# Patient Record
Sex: MALE | Race: BLACK OR AFRICAN AMERICAN | Employment: OTHER | URBAN - METROPOLITAN AREA
[De-identification: names, ages, dates, MRNs, and addresses within clinical notes are randomized per-mention and may not be internally consistent; named-entity substitution may affect disease eponyms.]

---

## 2021-08-09 ENCOUNTER — TRANSCRIBE ORDER (OUTPATIENT)
Dept: SCHEDULING | Age: 56
End: 2021-08-09

## 2021-08-09 DIAGNOSIS — Z01.89 RADIOLOGICAL EXAMINATION, NOT ELSEWHERE CLASSIFIED: Primary | ICD-10-CM

## 2021-08-17 ENCOUNTER — HOSPITAL ENCOUNTER (OUTPATIENT)
Dept: MRI IMAGING | Age: 56
Discharge: HOME | End: 2021-08-17
Payer: OTHER GOVERNMENT

## 2021-08-17 DIAGNOSIS — Z01.89 RADIOLOGICAL EXAMINATION, NOT ELSEWHERE CLASSIFIED: ICD-10-CM

## 2021-08-17 PROCEDURE — 73221 MRI JOINT UPR EXTREM W/O DYE: CPT

## 2021-08-17 PROCEDURE — 73721 MRI JNT OF LWR EXTRE W/O DYE: CPT

## 2024-04-09 ENCOUNTER — LAB REQUISITION (OUTPATIENT)
Dept: LAB | Age: 59
End: 2024-04-09

## 2024-04-09 DIAGNOSIS — Z13.9 ENCOUNTER FOR SCREENING, UNSPECIFIED: ICD-10-CM

## 2024-04-09 PROCEDURE — 86592 SYPHILIS TEST NON-TREP QUAL: CPT | Performed by: CLINICAL MEDICAL LABORATORY

## 2024-04-09 PROCEDURE — PSEU8570 RPR (RAPID PLASMA REAGIN) TRADITIONAL SYPHILIS SCREEN ALGORITHM: Performed by: CLINICAL MEDICAL LABORATORY

## 2024-04-09 PROCEDURE — PSEU8563 TREPONEMA PALLIDUM ANTIBODY IGG AND IGM: Performed by: CLINICAL MEDICAL LABORATORY

## 2024-04-09 PROCEDURE — 86780 TREPONEMA PALLIDUM: CPT | Performed by: CLINICAL MEDICAL LABORATORY

## 2024-04-10 LAB
RPR SER QL: NONREACTIVE
T PALLIDUM IGG SER QL IA: REACTIVE

## 2024-11-13 ENCOUNTER — APPOINTMENT (OUTPATIENT)
Facility: HOSPITAL | Age: 59
End: 2024-11-13
Attending: SURGERY
Payer: OTHER GOVERNMENT

## 2024-11-13 ENCOUNTER — APPOINTMENT (OUTPATIENT)
Facility: HOSPITAL | Age: 59
End: 2024-11-13
Payer: OTHER GOVERNMENT

## 2024-11-13 ENCOUNTER — HOSPITAL ENCOUNTER (EMERGENCY)
Facility: HOSPITAL | Age: 59
Discharge: ANOTHER HOSPITAL | End: 2024-11-13
Attending: EMERGENCY MEDICINE
Payer: OTHER GOVERNMENT

## 2024-11-13 ENCOUNTER — HOSPITAL ENCOUNTER (INPATIENT)
Facility: HOSPITAL | Age: 59
LOS: 3 days | Discharge: HOME | End: 2024-11-16
Attending: SURGERY | Admitting: SURGERY
Payer: OTHER GOVERNMENT

## 2024-11-13 VITALS
DIASTOLIC BLOOD PRESSURE: 88 MMHG | HEART RATE: 82 BPM | OXYGEN SATURATION: 100 % | WEIGHT: 227.5 LBS | SYSTOLIC BLOOD PRESSURE: 192 MMHG | TEMPERATURE: 98.8 F | RESPIRATION RATE: 18 BRPM

## 2024-11-13 DIAGNOSIS — K81.0 ACUTE CHOLECYSTITIS: Primary | ICD-10-CM

## 2024-11-13 DIAGNOSIS — R06.02 SHORTNESS OF BREATH: Primary | ICD-10-CM

## 2024-11-13 PROBLEM — R10.9 ABDOMINAL PAIN: Status: ACTIVE | Noted: 2024-11-13

## 2024-11-13 LAB
ALBUMIN SERPL-MCNC: 3.5 G/DL (ref 3.5–5)
ALBUMIN/GLOB SERPL: 0.8 (ref 1.1–2.2)
ALP SERPL-CCNC: 64 U/L (ref 45–117)
ALT SERPL-CCNC: 43 U/L (ref 12–78)
ANION GAP SERPL CALC-SCNC: 12 MMOL/L (ref 2–12)
AST SERPL W P-5'-P-CCNC: 31 U/L (ref 15–37)
BASOPHILS # BLD: 0 K/UL (ref 0–0.1)
BASOPHILS NFR BLD: 1 % (ref 0–1)
BILIRUB SERPL-MCNC: 1 MG/DL (ref 0.2–1)
BUN SERPL-MCNC: 17 MG/DL (ref 6–20)
BUN/CREAT SERPL: 13 (ref 12–20)
CA-I BLD-MCNC: 8.9 MG/DL (ref 8.5–10.1)
CHLORIDE SERPL-SCNC: 99 MMOL/L (ref 97–108)
CO2 SERPL-SCNC: 27 MMOL/L (ref 21–32)
CREAT SERPL-MCNC: 1.28 MG/DL (ref 0.7–1.3)
DIFFERENTIAL METHOD BLD: ABNORMAL
EOSINOPHIL # BLD: 0.1 K/UL (ref 0–0.4)
EOSINOPHIL NFR BLD: 1 % (ref 0–7)
ERYTHROCYTE [DISTWIDTH] IN BLOOD BY AUTOMATED COUNT: 14.6 % (ref 11.5–14.5)
ETHANOL SERPL-MCNC: <10 MG/DL (ref 0–0.08)
GLOBULIN SER CALC-MCNC: 4.5 G/DL (ref 2–4)
GLUCOSE SERPL-MCNC: 121 MG/DL (ref 65–100)
HCT VFR BLD AUTO: 42.5 % (ref 36.6–50.3)
HGB BLD-MCNC: 14.6 G/DL (ref 12.1–17)
IMM GRANULOCYTES # BLD AUTO: 0 K/UL (ref 0–0.04)
IMM GRANULOCYTES NFR BLD AUTO: 0 % (ref 0–0.5)
LACTATE SERPL-SCNC: 1 MMOL/L (ref 0.4–2)
LACTATE SERPL-SCNC: 2.1 MMOL/L (ref 0.4–2)
LIPASE SERPL-CCNC: 46 U/L (ref 13–75)
LYMPHOCYTES # BLD: 0.8 K/UL (ref 0.8–3.5)
LYMPHOCYTES NFR BLD: 10 % (ref 12–49)
MAGNESIUM SERPL-MCNC: 1.7 MG/DL (ref 1.6–2.4)
MCH RBC QN AUTO: 26 PG (ref 26–34)
MCHC RBC AUTO-ENTMCNC: 34.4 G/DL (ref 30–36.5)
MCV RBC AUTO: 75.6 FL (ref 80–99)
MONOCYTES # BLD: 0.7 K/UL (ref 0–1)
MONOCYTES NFR BLD: 9 % (ref 5–13)
NEUTS SEG # BLD: 6.1 K/UL (ref 1.8–8)
NEUTS SEG NFR BLD: 79 % (ref 32–75)
NRBC # BLD: 0 K/UL (ref 0–0.01)
NRBC BLD-RTO: 0 PER 100 WBC
PLATELET # BLD AUTO: 221 K/UL (ref 150–400)
PMV BLD AUTO: 9 FL (ref 8.9–12.9)
POTASSIUM SERPL-SCNC: 3.5 MMOL/L (ref 3.5–5.1)
PROT SERPL-MCNC: 8 G/DL (ref 6.4–8.2)
RBC # BLD AUTO: 5.62 M/UL (ref 4.1–5.7)
SODIUM SERPL-SCNC: 138 MMOL/L (ref 136–145)
WBC # BLD AUTO: 7.7 K/UL (ref 4.1–11.1)

## 2024-11-13 PROCEDURE — 6370000000 HC RX 637 (ALT 250 FOR IP): Performed by: SURGERY

## 2024-11-13 PROCEDURE — 36415 COLL VENOUS BLD VENIPUNCTURE: CPT

## 2024-11-13 PROCEDURE — 1100000000 HC RM PRIVATE

## 2024-11-13 PROCEDURE — 83605 ASSAY OF LACTIC ACID: CPT

## 2024-11-13 PROCEDURE — 2580000003 HC RX 258: Performed by: SURGERY

## 2024-11-13 PROCEDURE — 6360000002 HC RX W HCPCS: Performed by: EMERGENCY MEDICINE

## 2024-11-13 PROCEDURE — 82077 ASSAY SPEC XCP UR&BREATH IA: CPT

## 2024-11-13 PROCEDURE — 83690 ASSAY OF LIPASE: CPT

## 2024-11-13 PROCEDURE — 85025 COMPLETE CBC W/AUTO DIFF WBC: CPT

## 2024-11-13 PROCEDURE — 80053 COMPREHEN METABOLIC PANEL: CPT

## 2024-11-13 PROCEDURE — 2580000003 HC RX 258: Performed by: EMERGENCY MEDICINE

## 2024-11-13 PROCEDURE — 83735 ASSAY OF MAGNESIUM: CPT

## 2024-11-13 PROCEDURE — 96375 TX/PRO/DX INJ NEW DRUG ADDON: CPT

## 2024-11-13 PROCEDURE — 6370000000 HC RX 637 (ALT 250 FOR IP): Performed by: EMERGENCY MEDICINE

## 2024-11-13 PROCEDURE — 76705 ECHO EXAM OF ABDOMEN: CPT

## 2024-11-13 PROCEDURE — 99285 EMERGENCY DEPT VISIT HI MDM: CPT

## 2024-11-13 PROCEDURE — 6360000004 HC RX CONTRAST MEDICATION: Performed by: SURGERY

## 2024-11-13 PROCEDURE — 6360000002 HC RX W HCPCS: Performed by: SURGERY

## 2024-11-13 PROCEDURE — 96365 THER/PROPH/DIAG IV INF INIT: CPT

## 2024-11-13 RX ORDER — SODIUM CHLORIDE, SODIUM LACTATE, POTASSIUM CHLORIDE, CALCIUM CHLORIDE 600; 310; 30; 20 MG/100ML; MG/100ML; MG/100ML; MG/100ML
INJECTION, SOLUTION INTRAVENOUS CONTINUOUS
Status: DISCONTINUED | OUTPATIENT
Start: 2024-11-13 | End: 2024-11-14

## 2024-11-13 RX ORDER — 0.9 % SODIUM CHLORIDE 0.9 %
1000 INTRAVENOUS SOLUTION INTRAVENOUS ONCE
Status: COMPLETED | OUTPATIENT
Start: 2024-11-13 | End: 2024-11-13

## 2024-11-13 RX ORDER — KETOROLAC TROMETHAMINE 30 MG/ML
30 INJECTION, SOLUTION INTRAMUSCULAR; INTRAVENOUS
Status: COMPLETED | OUTPATIENT
Start: 2024-11-13 | End: 2024-11-13

## 2024-11-13 RX ORDER — ONDANSETRON 2 MG/ML
4 INJECTION INTRAMUSCULAR; INTRAVENOUS EVERY 6 HOURS PRN
Status: DISCONTINUED | OUTPATIENT
Start: 2024-11-13 | End: 2024-11-16 | Stop reason: HOSPADM

## 2024-11-13 RX ORDER — ACETAMINOPHEN 500 MG
1000 TABLET ORAL
Status: DISCONTINUED | OUTPATIENT
Start: 2024-11-13 | End: 2024-11-13 | Stop reason: HOSPADM

## 2024-11-13 RX ORDER — DIATRIZOATE MEGLUMINE AND DIATRIZOATE SODIUM 660; 100 MG/ML; MG/ML
30 SOLUTION ORAL; RECTAL
Status: DISCONTINUED | OUTPATIENT
Start: 2024-11-13 | End: 2024-11-15

## 2024-11-13 RX ORDER — MORPHINE SULFATE 2 MG/ML
2 INJECTION, SOLUTION INTRAMUSCULAR; INTRAVENOUS EVERY 4 HOURS PRN
Status: DISCONTINUED | OUTPATIENT
Start: 2024-11-13 | End: 2024-11-14

## 2024-11-13 RX ORDER — ONDANSETRON 2 MG/ML
4 INJECTION INTRAMUSCULAR; INTRAVENOUS ONCE
Status: COMPLETED | OUTPATIENT
Start: 2024-11-13 | End: 2024-11-13

## 2024-11-13 RX ORDER — ACETAMINOPHEN 325 MG/1
650 TABLET ORAL EVERY 6 HOURS PRN
Status: DISCONTINUED | OUTPATIENT
Start: 2024-11-13 | End: 2024-11-16 | Stop reason: HOSPADM

## 2024-11-13 RX ORDER — NIFEDIPINE 30 MG/1
30 TABLET, EXTENDED RELEASE ORAL
Status: COMPLETED | OUTPATIENT
Start: 2024-11-13 | End: 2024-11-13

## 2024-11-13 RX ADMIN — SODIUM CHLORIDE, POTASSIUM CHLORIDE, SODIUM LACTATE AND CALCIUM CHLORIDE: 600; 310; 30; 20 INJECTION, SOLUTION INTRAVENOUS at 23:13

## 2024-11-13 RX ADMIN — PIPERACILLIN AND TAZOBACTAM 4500 MG: 4; .5 INJECTION, POWDER, FOR SOLUTION INTRAVENOUS; PARENTERAL at 11:13

## 2024-11-13 RX ADMIN — KETOROLAC TROMETHAMINE 30 MG: 30 INJECTION, SOLUTION INTRAMUSCULAR at 09:01

## 2024-11-13 RX ADMIN — SODIUM CHLORIDE 1000 ML: 9 INJECTION, SOLUTION INTRAVENOUS at 09:02

## 2024-11-13 RX ADMIN — ONDANSETRON 4 MG: 2 INJECTION, SOLUTION INTRAMUSCULAR; INTRAVENOUS at 15:52

## 2024-11-13 RX ADMIN — MORPHINE SULFATE 2 MG: 2 INJECTION, SOLUTION INTRAMUSCULAR; INTRAVENOUS at 23:06

## 2024-11-13 RX ADMIN — HYDROMORPHONE HYDROCHLORIDE 1 MG: 1 INJECTION, SOLUTION INTRAMUSCULAR; INTRAVENOUS; SUBCUTANEOUS at 15:52

## 2024-11-13 RX ADMIN — NIFEDIPINE 30 MG: 30 TABLET, EXTENDED RELEASE ORAL at 14:39

## 2024-11-13 RX ADMIN — DIATRIZOATE MEGLUMINE AND DIATRIZOATE SODIUM 30 ML: 660; 100 LIQUID ORAL; RECTAL at 21:52

## 2024-11-13 RX ADMIN — ACETAMINOPHEN 650 MG: 325 TABLET ORAL at 23:08

## 2024-11-13 ASSESSMENT — PAIN DESCRIPTION - LOCATION
LOCATION: ABDOMEN

## 2024-11-13 ASSESSMENT — PAIN DESCRIPTION - DESCRIPTORS
DESCRIPTORS: SHOOTING
DESCRIPTORS: PRESSURE
DESCRIPTORS: SHARP

## 2024-11-13 ASSESSMENT — PAIN DESCRIPTION - ORIENTATION
ORIENTATION: MID
ORIENTATION: ANTERIOR
ORIENTATION: ANTERIOR

## 2024-11-13 ASSESSMENT — PAIN SCALES - GENERAL
PAINLEVEL_OUTOF10: 8
PAINLEVEL_OUTOF10: 7
PAINLEVEL_OUTOF10: 7
PAINLEVEL_OUTOF10: 10

## 2024-11-13 ASSESSMENT — PAIN - FUNCTIONAL ASSESSMENT: PAIN_FUNCTIONAL_ASSESSMENT: 0-10

## 2024-11-13 NOTE — PROGRESS NOTES
4 Eyes Skin Assessment     NAME:  Ranjit Medrano  YOB: 1965  MEDICAL RECORD NUMBER:  [id number]    The patient is being assessed for  Admission    I agree that at least one RN has performed a thorough Head to Toe Skin Assessment on the patient. ALL assessment sites listed below have been assessed.      Areas assessed by both nurses:    Head, Face, Ears, Shoulders, Back, Chest, Arms, Elbows, Hands, Sacrum. Buttock, Coccyx, Ischium, Legs. Feet and Heels, and Under Medical Devices         Does the Patient have a Wound? No noted wound(s)       Sammy Prevention initiated by RN: No  Wound Care Orders initiated by RN: No    Pressure Injury (Stage 3,4, Unstageable, DTI, NWPT, and Complex wounds) if present, place Wound referral order by RN under : No    New Ostomies, if present place, Ostomy referral order under : No     Nurse 1 eSignature: Electronically signed by HONORIO BECKWITH RN on 11/13/24 at 5:19 PM EST    **SHARE this note so that the co-signing nurse can place an eSignature**    Nurse 2 eSignature: Electronically signed by Autumn Resendez RN on 11/13/24 at 10:01 PM EST

## 2024-11-13 NOTE — ED TRIAGE NOTES
Patient arrives from home for mid abdominal pain since Monday with one episode of vomiting. Patient denies any abdominal surgeries. Reports black stools this morning, no use of blood thinners.

## 2024-11-13 NOTE — ED PROVIDER NOTES
consultations with specialist, family decision- making, bedside attention and documentation. Time is exclusive of EKG interpretation, imaging interpretation and separately billed procedures.  During this entire length of time I was immediately available to the patient.  Mila Villela MD    FINAL IMPRESSION   No diagnosis found.      DISPOSITION/PLAN   DISPOSITION           Transfer: The patient is being transferred to [facility]. The results of their tests and reasons for their transfer have been discussed with the patient and/or available family. The patient/family has conveyed agreement and understanding for the need to be transferred and for their diagnosis. Consultation has been made with Dr. Hebert, who agrees to accept the transfer.      PATIENT REFERRED TO:  No follow-up provider specified.      DISCHARGE MEDICATIONS:     Medication List      You have not been prescribed any medications.           DISCONTINUED MEDICATIONS:  There are no discharge medications for this patient.      I am the Primary Clinician of Record: Mila Villela MD (electronically signed)    (Please note that parts of this dictation were completed with voice recognition software. Quite often unanticipated grammatical, syntax, homophones, and other interpretive errors are inadvertently transcribed by the computer software. Please disregards these errors. Please excuse any errors that have escaped final proofreading.)     Mila Villela MD  11/14/24 6786

## 2024-11-13 NOTE — ED NOTES
Patient provided a warm blanket after returning from the restroom.  His temperature was checked prior to blanket being provided due to patient having chills.  Patient's temperature was 98.5.  No other needs at this time.

## 2024-11-14 ENCOUNTER — APPOINTMENT (OUTPATIENT)
Facility: HOSPITAL | Age: 59
End: 2024-11-14
Attending: SURGERY
Payer: OTHER GOVERNMENT

## 2024-11-14 LAB
APPEARANCE UR: CLEAR
BACTERIA URNS QL MICRO: NEGATIVE /HPF
BASOPHILS # BLD: 0 K/UL (ref 0–0.1)
BASOPHILS NFR BLD: 0 % (ref 0–1)
BILIRUB UR QL: NEGATIVE
BNP SERPL-MCNC: 253 PG/ML
COLOR UR: ABNORMAL
DIFFERENTIAL METHOD BLD: ABNORMAL
EOSINOPHIL # BLD: 0 K/UL (ref 0–0.4)
EOSINOPHIL NFR BLD: 0 % (ref 0–7)
EPITH CASTS URNS QL MICRO: ABNORMAL /LPF
ERYTHROCYTE [DISTWIDTH] IN BLOOD BY AUTOMATED COUNT: 14.8 % (ref 11.5–14.5)
FLUAV RNA SPEC QL NAA+PROBE: NOT DETECTED
FLUBV RNA SPEC QL NAA+PROBE: NOT DETECTED
GLUCOSE UR STRIP.AUTO-MCNC: 50 MG/DL
HCT VFR BLD AUTO: 43.2 % (ref 36.6–50.3)
HGB BLD-MCNC: 14.2 G/DL (ref 12.1–17)
HGB UR QL STRIP: ABNORMAL
IMM GRANULOCYTES # BLD AUTO: 0 K/UL (ref 0–0.04)
IMM GRANULOCYTES NFR BLD AUTO: 0 % (ref 0–0.5)
KETONES UR QL STRIP.AUTO: NEGATIVE MG/DL
LEUKOCYTE ESTERASE UR QL STRIP.AUTO: NEGATIVE
LYMPHOCYTES # BLD: 0.8 K/UL (ref 0.8–3.5)
LYMPHOCYTES NFR BLD: 11 % (ref 12–49)
M PNEUMO IGM SER IA-ACNC: NONREACTIVE
MCH RBC QN AUTO: 25.5 PG (ref 26–34)
MCHC RBC AUTO-ENTMCNC: 32.9 G/DL (ref 30–36.5)
MCV RBC AUTO: 77.6 FL (ref 80–99)
MONOCYTES # BLD: 0.8 K/UL (ref 0–1)
MONOCYTES NFR BLD: 11 % (ref 5–13)
NEUTS SEG # BLD: 5.4 K/UL (ref 1.8–8)
NEUTS SEG NFR BLD: 78 % (ref 32–75)
NITRITE UR QL STRIP.AUTO: NEGATIVE
NRBC # BLD: 0 K/UL (ref 0–0.01)
NRBC BLD-RTO: 0 PER 100 WBC
PH UR STRIP: 6 (ref 5–8)
PLATELET # BLD AUTO: 193 K/UL (ref 150–400)
PMV BLD AUTO: 9.8 FL (ref 8.9–12.9)
PROT UR STRIP-MCNC: 100 MG/DL
RBC # BLD AUTO: 5.57 M/UL (ref 4.1–5.7)
RBC #/AREA URNS HPF: ABNORMAL /HPF (ref 0–5)
SARS-COV-2 RNA RESP QL NAA+PROBE: NOT DETECTED
SP GR UR REFRACTOMETRY: 1.02 (ref 1–1.03)
T4 FREE SERPL-MCNC: 0.9 NG/DL (ref 0.8–1.5)
TSH SERPL DL<=0.05 MIU/L-ACNC: 0.76 UIU/ML (ref 0.36–3.74)
URINE CULTURE IF INDICATED: ABNORMAL
UROBILINOGEN UR QL STRIP.AUTO: 4 EU/DL (ref 0.1–1)
WBC # BLD AUTO: 7 K/UL (ref 4.1–11.1)
WBC URNS QL MICRO: ABNORMAL /HPF (ref 0–4)

## 2024-11-14 PROCEDURE — 2700000000 HC OXYGEN THERAPY PER DAY

## 2024-11-14 PROCEDURE — 6360000002 HC RX W HCPCS: Performed by: INTERNAL MEDICINE

## 2024-11-14 PROCEDURE — 84439 ASSAY OF FREE THYROXINE: CPT

## 2024-11-14 PROCEDURE — 36415 COLL VENOUS BLD VENIPUNCTURE: CPT

## 2024-11-14 PROCEDURE — 6360000002 HC RX W HCPCS: Performed by: SURGERY

## 2024-11-14 PROCEDURE — 93005 ELECTROCARDIOGRAM TRACING: CPT

## 2024-11-14 PROCEDURE — 74177 CT ABD & PELVIS W/CONTRAST: CPT

## 2024-11-14 PROCEDURE — 6370000000 HC RX 637 (ALT 250 FOR IP): Performed by: SURGERY

## 2024-11-14 PROCEDURE — 85025 COMPLETE CBC W/AUTO DIFF WBC: CPT

## 2024-11-14 PROCEDURE — 87636 SARSCOV2 & INF A&B AMP PRB: CPT

## 2024-11-14 PROCEDURE — 2580000003 HC RX 258: Performed by: INTERNAL MEDICINE

## 2024-11-14 PROCEDURE — 1100000000 HC RM PRIVATE

## 2024-11-14 PROCEDURE — 81001 URINALYSIS AUTO W/SCOPE: CPT

## 2024-11-14 PROCEDURE — 94761 N-INVAS EAR/PLS OXIMETRY MLT: CPT

## 2024-11-14 PROCEDURE — 2580000003 HC RX 258: Performed by: SURGERY

## 2024-11-14 PROCEDURE — 83880 ASSAY OF NATRIURETIC PEPTIDE: CPT

## 2024-11-14 PROCEDURE — 71045 X-RAY EXAM CHEST 1 VIEW: CPT

## 2024-11-14 PROCEDURE — 84443 ASSAY THYROID STIM HORMONE: CPT

## 2024-11-14 PROCEDURE — 6360000004 HC RX CONTRAST MEDICATION: Performed by: SURGERY

## 2024-11-14 PROCEDURE — 83540 ASSAY OF IRON: CPT

## 2024-11-14 PROCEDURE — 82728 ASSAY OF FERRITIN: CPT

## 2024-11-14 PROCEDURE — 86738 MYCOPLASMA ANTIBODY: CPT

## 2024-11-14 RX ORDER — IBUPROFEN 800 MG/1
800 TABLET, FILM COATED ORAL EVERY 6 HOURS PRN
COMMUNITY

## 2024-11-14 RX ORDER — PRAZOSIN HYDROCHLORIDE 2 MG/1
2 CAPSULE ORAL DAILY
COMMUNITY

## 2024-11-14 RX ORDER — IOPAMIDOL 755 MG/ML
100 INJECTION, SOLUTION INTRAVASCULAR
Status: COMPLETED | OUTPATIENT
Start: 2024-11-14 | End: 2024-11-14

## 2024-11-14 RX ORDER — KETOROLAC TROMETHAMINE 15 MG/ML
15 INJECTION, SOLUTION INTRAMUSCULAR; INTRAVENOUS EVERY 8 HOURS PRN
Status: DISCONTINUED | OUTPATIENT
Start: 2024-11-14 | End: 2024-11-16 | Stop reason: HOSPADM

## 2024-11-14 RX ORDER — TRAZODONE HYDROCHLORIDE 50 MG/1
5 TABLET, FILM COATED ORAL NIGHTLY
COMMUNITY

## 2024-11-14 RX ORDER — TRAMADOL HYDROCHLORIDE 50 MG/1
50 TABLET ORAL EVERY 8 HOURS PRN
COMMUNITY

## 2024-11-14 RX ORDER — SODIUM CHLORIDE, SODIUM LACTATE, POTASSIUM CHLORIDE, CALCIUM CHLORIDE 600; 310; 30; 20 MG/100ML; MG/100ML; MG/100ML; MG/100ML
INJECTION, SOLUTION INTRAVENOUS CONTINUOUS
Status: DISCONTINUED | OUTPATIENT
Start: 2024-11-14 | End: 2024-11-15

## 2024-11-14 RX ADMIN — ACETAMINOPHEN 650 MG: 325 TABLET ORAL at 05:03

## 2024-11-14 RX ADMIN — MORPHINE SULFATE 2 MG: 2 INJECTION, SOLUTION INTRAMUSCULAR; INTRAVENOUS at 05:17

## 2024-11-14 RX ADMIN — MORPHINE SULFATE 2 MG: 2 INJECTION, SOLUTION INTRAMUSCULAR; INTRAVENOUS at 12:50

## 2024-11-14 RX ADMIN — SODIUM CHLORIDE, POTASSIUM CHLORIDE, SODIUM LACTATE AND CALCIUM CHLORIDE: 600; 310; 30; 20 INJECTION, SOLUTION INTRAVENOUS at 07:16

## 2024-11-14 RX ADMIN — PIPERACILLIN AND TAZOBACTAM 3375 MG: 3; .375 INJECTION, POWDER, LYOPHILIZED, FOR SOLUTION INTRAVENOUS at 17:20

## 2024-11-14 RX ADMIN — PIPERACILLIN AND TAZOBACTAM 3375 MG: 3; .375 INJECTION, POWDER, LYOPHILIZED, FOR SOLUTION INTRAVENOUS at 12:44

## 2024-11-14 RX ADMIN — ACETAMINOPHEN 650 MG: 325 TABLET ORAL at 17:22

## 2024-11-14 RX ADMIN — PIPERACILLIN AND TAZOBACTAM 3375 MG: 3; .375 INJECTION, POWDER, LYOPHILIZED, FOR SOLUTION INTRAVENOUS at 23:49

## 2024-11-14 RX ADMIN — KETOROLAC TROMETHAMINE 15 MG: 15 INJECTION, SOLUTION INTRAMUSCULAR; INTRAVENOUS at 20:30

## 2024-11-14 RX ADMIN — AZITHROMYCIN MONOHYDRATE 500 MG: 500 INJECTION, POWDER, LYOPHILIZED, FOR SOLUTION INTRAVENOUS at 11:01

## 2024-11-14 RX ADMIN — PIPERACILLIN AND TAZOBACTAM 3375 MG: 3; .375 INJECTION, POWDER, LYOPHILIZED, FOR SOLUTION INTRAVENOUS at 00:33

## 2024-11-14 RX ADMIN — SODIUM CHLORIDE, POTASSIUM CHLORIDE, SODIUM LACTATE AND CALCIUM CHLORIDE: 600; 310; 30; 20 INJECTION, SOLUTION INTRAVENOUS at 20:34

## 2024-11-14 RX ADMIN — PIPERACILLIN AND TAZOBACTAM 3375 MG: 3; .375 INJECTION, POWDER, LYOPHILIZED, FOR SOLUTION INTRAVENOUS at 05:05

## 2024-11-14 RX ADMIN — IOPAMIDOL 100 ML: 755 INJECTION, SOLUTION INTRAVENOUS at 02:04

## 2024-11-14 ASSESSMENT — PAIN DESCRIPTION - LOCATION
LOCATION: ABDOMEN

## 2024-11-14 ASSESSMENT — PAIN SCALES - GENERAL
PAINLEVEL_OUTOF10: 0
PAINLEVEL_OUTOF10: 8
PAINLEVEL_OUTOF10: 5
PAINLEVEL_OUTOF10: 8
PAINLEVEL_OUTOF10: 8
PAINLEVEL_OUTOF10: 4
PAINLEVEL_OUTOF10: 7
PAINLEVEL_OUTOF10: 7
PAINLEVEL_OUTOF10: 4

## 2024-11-14 ASSESSMENT — PAIN DESCRIPTION - DESCRIPTORS
DESCRIPTORS: SORE
DESCRIPTORS: SORE

## 2024-11-14 ASSESSMENT — PAIN DESCRIPTION - ORIENTATION
ORIENTATION: ANTERIOR
ORIENTATION: ANTERIOR

## 2024-11-14 NOTE — CONSULTS
0700  In: 436.6 [I.V.:393.8]  Out: -     Physical Exam:   General appearance: alert, appears stated age, cooperative, and moderately obese  Head: Normocephalic, without obvious abnormality, atraumatic  Eyes: negative  Neck: no adenopathy and supple, symmetrical, trachea midline  Lungs: clear to auscultation bilaterally and currently on 3 L nasal cannula  Heart: regular rate and rhythm, S1, S2 normal, no murmur, click, rub or gallop  Abdomen:  Soft, normal bowel sounds, mildly tender in the right upper quadrant  Extremities: extremities normal, atraumatic, no cyanosis or edema  Pulses: 2+ and symmetric  Skin: Skin color, texture, turgor normal. No rashes or lesions  Lymph nodes: Cervical, supraclavicular, and axillary nodes normal.  Neurologic: Grossly normal, alert and oriented x 3, no focal neurologic abnormalities    Additional comments:None    Lab/Data Review:    Recent Results (from the past 24 hour(s))   Lactic Acid    Collection Time: 11/13/24 10:45 AM   Result Value Ref Range    Lactic Acid, Plasma 1.0 0.4 - 2.0 mmol/L       Right upper quadrant ultrasound (11/13/2024):  FINDINGS:     Liver: echogenicity is moderately increased. Surface is smooth. Multiple small  anechoic cysts. Largest cyst measures up to 1.7 cm, increased since 2016.     Main portal vein flow: Toward the liver.     Fluid: No ascites.     Gallbladder: Shadowing subcentimeter multiple gallstones. Mild gallbladder wall  thickening. Positive pericholecystic fluid is subtle. Positive sonographic  Mari sign.     Bile ducts: There is no intra or extrahepatic biliary ductal dilatation.  The  visible extrahepatic bile duct measures 5 mm.     Pancreas: Obscured by bowel gas.     Kidneys: Right length: 10.3 cm. No hydronephrosis. 3 cm anechoic cyst has  increased in size since 2016.     IMPRESSION:     1. Cholelithiasis and acute versus subacute cholecystitis.  2. Moderate hepatic steatosis. Consider liver biopsy if cholecystectomy is  performed.  Increased hepatic benign cysts.  3. Increased right renal benign cyst.         CT abdomen/pelvis w/ IVC (11/14/2024):  FINDINGS:  LUNG BASES: No abnormality.  LIVER: Numerous small hypodensities throughout the liver similar to prior study  most of which are too small to characterize but likely represent cysts.  GALLBLADDER: Small gallstones in the dependent portion.  SPLEEN: No enlargement or lesion.  PANCREAS: No mass or ductal dilatation.  ADRENALS: No mass.  KIDNEYS: Numerous small hypodensities as well as larger cysts in both kidneys,  similar to prior study. No hydronephrosis.  GI TRACT: No bowel obstruction.  APPENDIX: Unremarkable.  PERITONEUM: No free air or free fluid.  RETROPERITONEUM: No aortic aneurysm.  LYMPH NODES: None enlarged.  ADDITIONAL COMMENTS: N/A.     URINARY BLADDER: Under distended, thickened.  REPRODUCTIVE ORGANS: no acute findings.  LYMPH NODES: None enlarged.  FREE FLUID: None.  BONES: No destructive bone lesion.  ADDITIONAL COMMENTS: N/A.     IMPRESSION:  No acute process. No evidence of appendicitis as questioned clinically.         Assessment:     Patient is a 58-year-old obese -American male with a history of LENA on CPAP, hypertension, and osteoarthritis who presented to the hospital with abdominal pain and has been found to have evidence of possible cholecystitis.    Plan:     1.)  Acute hypoxemic respiratory failure  -Unclear etiology, but suspect underlying atelectasis from not taking deep breaths related to abdominal pain from acute cholecystitis.  -Currently on 3 L nasal cannula, wean off for goal sats greater than 90% on room air.  -Recommend walking O2 test prior to discharge.  -Continue aggressive incentive spirometer use.  -Hold off on ABG, chest x-ray pending.  Pulmonary infectious workup pending.    2.)  Severe sepsis  -Lactate 2.1 on admission, with Tmax 102.7 °F, surprisingly no leukocytosis  -Lactic acidosis now resolved with IV fluids; currently

## 2024-11-14 NOTE — PLAN OF CARE
Problem: Discharge Planning  Goal: Discharge to home or other facility with appropriate resources  11/14/2024 1139 by Gustavo Alvarado RN  Outcome: Progressing  11/13/2024 2220 by Autumn Resendez RN  Outcome: Progressing  Flowsheets (Taken 11/13/2024 2150)  Discharge to home or other facility with appropriate resources: Identify barriers to discharge with patient and caregiver     Problem: Skin/Tissue Integrity  Goal: Absence of new skin breakdown  Description: 1.  Monitor for areas of redness and/or skin breakdown  2.  Assess vascular access sites hourly  3.  Every 4-6 hours minimum:  Change oxygen saturation probe site  4.  Every 4-6 hours:  If on nasal continuous positive airway pressure, respiratory therapy assess nares and determine need for appliance change or resting period.  11/14/2024 1139 by Gustavo Alvarado RN  Outcome: Progressing  11/13/2024 2220 by Autumn Resendez RN  Outcome: Progressing     Problem: Pain  Goal: Verbalizes/displays adequate comfort level or baseline comfort level  11/14/2024 1139 by Gustavo Alvarado RN  Outcome: Progressing  Flowsheets (Taken 11/14/2024 0505 by Autumn Resendez RN)  Verbalizes/displays adequate comfort level or baseline comfort level: Assess pain using appropriate pain scale  11/13/2024 2220 by Autumn Resendez RN  Outcome: Progressing  Flowsheets (Taken 11/13/2024 1923)  Verbalizes/displays adequate comfort level or baseline comfort level: Assess pain using appropriate pain scale

## 2024-11-14 NOTE — H&P
polyuria, No cold intolerance, No heat intolerance, No excessive hunger.  Immunologic: Not immunocompromised, No recurrent fevers, No recurrent infections.  Musculoskeletal: No back pain, No neck pain, No joint pain, No muscle pain, No claudication, No decreased range of motion, No trauma.  Integumentary: No rash, No pruritus, No abrasions.  Neurologic: Alert and oriented X4, No abnormal balance, No headache, No confusion, No numbness, No tingling.  Psychiatric: No anxiety, No depression, No nadeem.    Physical Exam:     Vitals & Measurements:    Wt Readings from Last 3 Encounters:   11/13/24 103.2 kg (227 lb 8 oz)   11/13/24 103.2 kg (227 lb 8 oz)     Temp Readings from Last 3 Encounters:   11/13/24 (!) 102.4 °F (39.1 °C) (Oral)   11/13/24 98.8 °F (37.1 °C) (Oral)     BP Readings from Last 3 Encounters:   11/13/24 139/83   11/13/24 (!) 192/88     Pulse Readings from Last 3 Encounters:   11/13/24 96   11/13/24 82      Ht Readings from Last 3 Encounters:   No data found for Ht          General: well appearing, no acute distress  Head: Normal  Face: Nornal  HEENT: atraumatic, PERRLA, moist mucosa, normal pharynx, normal tonsils and adenoids, normal tongue, no fluid in sinuses  Neck: Trachea midline, no carotid bruit, no masses  Chest: Normal.  Respiratory: Normal chest wall expansion, CTA B, no r/r/w, no rubs  Cardiovascular: RRR, no m/r/g, Normal S1 and S2  Abdomen: Soft, right lower quadrant tenderness with mild guarding but no rebound, non-distended, normal bowel sounds in all quadrants, no hepatosplenomegaly, no tympany. Incision scar: And  Genitourinary: No inguinal hernia, normal external gentalia, Testis & scrotum normal, no renal angle tenderness  Rectal: deferred  Musculoskeletal: normal ROM in upper and lower extremities, No joint swelling.  Integumentary: Warm, dry, and pink, with no rash, purpura, or petechia  Heme/Lymph: No lymphadenopathy, no bruises  Neurological:Cranial Nerves II-XII grossly intact, no  gross motor or sensory deficit  Psychiatric: Cooperative with normal mood, affect, and cognition      Laboratory Values:   Recent Results (from the past 24 hour(s))   CBC with Auto Differential    Collection Time: 11/13/24  8:58 AM   Result Value Ref Range    WBC 7.7 4.1 - 11.1 K/uL    RBC 5.62 4.10 - 5.70 M/uL    Hemoglobin 14.6 12.1 - 17.0 g/dL    Hematocrit 42.5 36.6 - 50.3 %    MCV 75.6 (L) 80.0 - 99.0 FL    MCH 26.0 26.0 - 34.0 PG    MCHC 34.4 30.0 - 36.5 g/dL    RDW 14.6 (H) 11.5 - 14.5 %    Platelets 221 150 - 400 K/uL    MPV 9.0 8.9 - 12.9 FL    Nucleated RBCs 0.0 0.0  WBC    nRBC 0.00 0.00 - 0.01 K/uL    Neutrophils % 79 (H) 32 - 75 %    Lymphocytes % 10 (L) 12 - 49 %    Monocytes % 9 5 - 13 %    Eosinophils % 1 0 - 7 %    Basophils % 1 0 - 1 %    Immature Granulocytes % 0 0 - 0.5 %    Neutrophils Absolute 6.1 1.8 - 8.0 K/UL    Lymphocytes Absolute 0.8 0.8 - 3.5 K/UL    Monocytes Absolute 0.7 0.0 - 1.0 K/UL    Eosinophils Absolute 0.1 0.0 - 0.4 K/UL    Basophils Absolute 0.0 0.0 - 0.1 K/UL    Immature Granulocytes Absolute 0.0 0.00 - 0.04 K/UL    Differential Type AUTOMATED     Comprehensive Metabolic Panel    Collection Time: 11/13/24  8:58 AM   Result Value Ref Range    Sodium 138 136 - 145 mmol/L    Potassium 3.5 3.5 - 5.1 mmol/L    Chloride 99 97 - 108 mmol/L    CO2 27 21 - 32 mmol/L    Anion Gap 12 2 - 12 mmol/L    Glucose 121 (H) 65 - 100 mg/dL    BUN 17 6 - 20 mg/dL    Creatinine 1.28 0.70 - 1.30 mg/dL    BUN/Creatinine Ratio 13 12 - 20      Est, Glom Filt Rate 64 >60 ml/min/1.73m2    Calcium 8.9 8.5 - 10.1 mg/dL    Total Bilirubin 1.0 0.2 - 1.0 mg/dL    AST 31 15 - 37 U/L    ALT 43 12 - 78 U/L    Alk Phosphatase 64 45 - 117 U/L    Total Protein 8.0 6.4 - 8.2 g/dL    Albumin 3.5 3.5 - 5.0 g/dL    Globulin 4.5 (H) 2.0 - 4.0 g/dL    Albumin/Globulin Ratio 0.8 (L) 1.1 - 2.2     ETOH    Collection Time: 11/13/24  8:58 AM   Result Value Ref Range    Ethanol Lvl <10 <10 mg/dL   Lipase    Collection

## 2024-11-14 NOTE — CONSULTS
\"HMO8LZC\", \"BEART\", \"SIG7IIHP\", \"HGBART\", \"AP1SMDRMC\", \"FIO2A\", \"T9BHVMDT\", \"OXYHEM\", \"CARBOXHGBART\", \"METHGBART\", \"D6YNQFQYN\", \"PHCORART\", \"TEMP\" in the last 72 hours.    Invalid input(s): \"FPM5WQUFI\"    24 Hour Results:  Recent Results (from the past 24 hour(s))   CBC with Auto Differential    Collection Time: 11/14/24 10:44 AM   Result Value Ref Range    WBC 7.0 4.1 - 11.1 K/uL    RBC 5.57 4.10 - 5.70 M/uL    Hemoglobin 14.2 12.1 - 17.0 g/dL    Hematocrit 43.2 36.6 - 50.3 %    MCV 77.6 (L) 80.0 - 99.0 FL    MCH 25.5 (L) 26.0 - 34.0 PG    MCHC 32.9 30.0 - 36.5 g/dL    RDW 14.8 (H) 11.5 - 14.5 %    Platelets 193 150 - 400 K/uL    MPV 9.8 8.9 - 12.9 FL    Nucleated RBCs 0.0 0.0  WBC    nRBC 0.00 0.00 - 0.01 K/uL    Neutrophils % 78 (H) 32 - 75 %    Lymphocytes % 11 (L) 12 - 49 %    Monocytes % 11 5 - 13 %    Eosinophils % 0 0 - 7 %    Basophils % 0 0 - 1 %    Immature Granulocytes % 0 0 - 0.5 %    Neutrophils Absolute 5.4 1.8 - 8.0 K/UL    Lymphocytes Absolute 0.8 0.8 - 3.5 K/UL    Monocytes Absolute 0.8 0.0 - 1.0 K/UL    Eosinophils Absolute 0.0 0.0 - 0.4 K/UL    Basophils Absolute 0.0 0.0 - 0.1 K/UL    Immature Granulocytes Absolute 0.0 0.00 - 0.04 K/UL    Differential Type AUTOMATED     COVID-19 & Influenza Combo    Collection Time: 11/14/24 11:04 AM    Specimen: Nasopharyngeal   Result Value Ref Range    SARS-CoV-2, PCR Not Detected Not Detected      Rapid Influenza A By PCR Not Detected Not Detected      Rapid Influenza B By PCR Not Detected Not Detected         CT ABDOMEN PELVIS W IV CONTRAST Additional Contrast? Oral   Final Result   No acute process. No evidence of appendicitis as questioned clinically.      Electronically signed by Mitchell [facility] HEPATOBILIARY SCAN W PHARMACOLOGICAL INTERVENTION    (Results Pending)   XR CHEST PORTABLE    (Results Pending)          Discussion/MDM:     [] High (any 2)    A. Problems (any 1)  [] Acute/Chronic Illness/injury posing threat to life or bodily  function:    [] Severe exacerbation of chronic illness:    ---------------------------------------------------------------------  B. Risk of Treatment (any 1)   [] Drugs/treatments that require intensive monitoring for toxicity include:    [] IV ABX requiring serial renal monitoring for nephrotoxicity:     [] IV Narcotic analgesia for adverse drug reaction  [] Aggressive IV diuresis requiring serial monitoring for renal impairment and electrolyte derangements  [] Critical electrolyte abnormalities requiring IV replacement and close serial monitoring  [] SQ Insulin SS- monitoring serial FSBS for Hypoglycemic adverse drug reaction  [] Other -   [] Change in code status:    [] Decision to escalate care:    [] Major surgery/procedure with associated risk factors:    ----------------------------------------------------------------------  C. Data (any 2)  [x] Discussed current management and discharge planning options with Case Management.  [x] Discussed management of the case with:    [] Telemetry personally reviewed and interpreted as documented above    [] Imaging personally reviewed and interpreted, includes:    [x] Data Review (any 3)  [x] All available Consultant notes from yesterday/today were reviewed  [x] All current labs were reviewed and interpreted for clinical significance   [x] Appropriate follow-up labs were ordered  [] Collateral history obtained from:             Surekha Olivares MD

## 2024-11-14 NOTE — PLAN OF CARE
Problem: Discharge Planning  Goal: Discharge to home or other facility with appropriate resources  11/13/2024 2220 by Autumn Resendez RN  Outcome: Progressing  Flowsheets (Taken 11/13/2024 2150)  Discharge to home or other facility with appropriate resources: Identify barriers to discharge with patient and caregiver  11/13/2024 1718 by Shaila Briseno, RN  Outcome: Progressing     Problem: Skin/Tissue Integrity  Goal: Absence of new skin breakdown  Description: 1.  Monitor for areas of redness and/or skin breakdown  2.  Assess vascular access sites hourly  3.  Every 4-6 hours minimum:  Change oxygen saturation probe site  4.  Every 4-6 hours:  If on nasal continuous positive airway pressure, respiratory therapy assess nares and determine need for appliance change or resting period.  11/13/2024 2220 by Autumn Resendez RN  Outcome: Progressing  11/13/2024 1718 by Shaila Briseno, RN  Outcome: Progressing     Problem: Pain  Goal: Verbalizes/displays adequate comfort level or baseline comfort level  Outcome: Progressing  Flowsheets (Taken 11/13/2024 1923)  Verbalizes/displays adequate comfort level or baseline comfort level: Assess pain using appropriate pain scale

## 2024-11-14 NOTE — PROGRESS NOTES
[facility], stated procedure(HIDA scan) will be schedule for tomorrow. Pt updated.    1515- Provider Emilee Notified at this time, that [facility] reschedule procedure for tomorrow.

## 2024-11-14 NOTE — PROGRESS NOTES
1930- Contacted MD Emilee for tylenol for a fever of 102.4F and pain meds.     2134- No new orders placed yet    2152- Gastrografin given 15mL/30mL    2228- Gastrografin given other half of 15mL and contacted CT    2200- Cornelia in OR contacted for MD Emilee while in surgery.     2256- Orders received     0146- Down for CT scan    0206- Back from CT, in bed with call bell within reach

## 2024-11-14 NOTE — CARE COORDINATION
11/14/24 1117   Service Assessment   Patient Orientation Alert and Oriented   Cognition Alert   History Provided By Patient   Primary Caregiver Self   Support Systems Children;Family Members   Patient's Healthcare Decision Maker is: Legal Next of Kin   PCP Verified by CM Yes   Last Visit to PCP Within last 3 months   Prior Functional Level Independent in ADLs/IADLs   Current Functional Level Independent in ADLs/IADLs   Can patient return to prior living arrangement Yes   Ability to make needs known: Good   Family able to assist with home care needs: Yes   Would you like for me to discuss the discharge plan with any other family members/significant others, and if so, who? Yes     Advance Care Planning     General Advance Care Planning (ACP) Conversation    Date of Conversation: 11/14/2024  Conducted with: Patient with Decision Making Capacity  Other persons present: None    Healthcare Decision Maker:   Primary Decision Maker: Tesfaye Medrano - Child - [phone]         Length of Voluntary ACP Conversation in minutes:  <16 minutes (Non-Billable)    Heriberto Forman RN         CM met with patient at bedside to complete discharge planning assessment. Patient lives at address on faces[place] alone.  Patient does use a cane occasionally and reports independence otherwise.  Patient declined the need for equipment or services at time of discharge.  Of note, patient is on 3LNC of oxygen now, but no oxygen use at home.  Current disposition is home.

## 2024-11-15 ENCOUNTER — APPOINTMENT (OUTPATIENT)
Facility: HOSPITAL | Age: 59
End: 2024-11-15
Attending: SURGERY
Payer: OTHER GOVERNMENT

## 2024-11-15 ENCOUNTER — APPOINTMENT (OUTPATIENT)
Facility: HOSPITAL | Age: 59
End: 2024-11-15
Payer: OTHER GOVERNMENT

## 2024-11-15 LAB
ALBUMIN SERPL-MCNC: 2.7 G/DL (ref 3.5–5)
ANION GAP SERPL CALC-SCNC: 3 MMOL/L (ref 2–12)
APPEARANCE UR: CLEAR
BACTERIA URNS QL MICRO: NEGATIVE /HPF
BILIRUB UR QL: NEGATIVE
BUN SERPL-MCNC: 20 MG/DL (ref 6–20)
BUN/CREAT SERPL: 18 (ref 12–20)
CA-I BLD-MCNC: 8.8 MG/DL (ref 8.5–10.1)
CHLORIDE SERPL-SCNC: 107 MMOL/L (ref 97–108)
CO2 SERPL-SCNC: 31 MMOL/L (ref 21–32)
COLOR UR: ABNORMAL
CREAT SERPL-MCNC: 1.12 MG/DL (ref 0.7–1.3)
ECHO AO ASC DIAM: 3.4 CM
ECHO AO ASCENDING AORTA INDEX: 1.55 CM/M2
ECHO AO ROOT DIAM: 2.8 CM
ECHO AO ROOT INDEX: 1.28 CM/M2
ECHO AV AREA PEAK VELOCITY: 1.4 CM2
ECHO AV AREA VTI: 1.3 CM2
ECHO AV AREA/BSA PEAK VELOCITY: 0.6 CM2/M2
ECHO AV AREA/BSA VTI: 0.6 CM2/M2
ECHO AV MEAN GRADIENT: 9 MMHG
ECHO AV MEAN VELOCITY: 1.4 M/S
ECHO AV PEAK GRADIENT: 17 MMHG
ECHO AV PEAK VELOCITY: 2.1 M/S
ECHO AV VELOCITY RATIO: 0.57
ECHO AV VTI: 39.5 CM
ECHO BSA: 2.27 M2
ECHO EST RA PRESSURE: 3 MMHG
ECHO LA AREA 4C: 16.8 CM2
ECHO LA DIAMETER INDEX: 1.87 CM/M2
ECHO LA DIAMETER: 4.1 CM
ECHO LA MAJOR AXIS: 5.4 CM
ECHO LA TO AORTIC ROOT RATIO: 1.46
ECHO LA VOL MOD A4C: 44 ML (ref 18–58)
ECHO LA VOLUME INDEX MOD A4C: 20 ML/M2 (ref 16–34)
ECHO LV E' LATERAL VELOCITY: 13.9 CM/S
ECHO LV E' SEPTAL VELOCITY: 8.27 CM/S
ECHO LV EDV A4C: 149 ML
ECHO LV EDV INDEX A4C: 68 ML/M2
ECHO LV EF PHYSICIAN: 55 %
ECHO LV EJECTION FRACTION 3D: 60 %
ECHO LV EJECTION FRACTION A4C: 56 %
ECHO LV EJECTION FRACTION BIPLANE: 56 % (ref 55–100)
ECHO LV ESV A4C: 66 ML
ECHO LV ESV INDEX A4C: 30 ML/M2
ECHO LV FRACTIONAL SHORTENING: 42 % (ref 28–44)
ECHO LV INTERNAL DIMENSION DIASTOLE INDEX: 2.19 CM/M2
ECHO LV INTERNAL DIMENSION DIASTOLIC: 4.8 CM (ref 4.2–5.9)
ECHO LV INTERNAL DIMENSION SYSTOLIC INDEX: 1.28 CM/M2
ECHO LV INTERNAL DIMENSION SYSTOLIC: 2.8 CM
ECHO LV IVSD: 1.5 CM (ref 0.6–1)
ECHO LV MASS 2D: 288.4 G (ref 88–224)
ECHO LV MASS INDEX 2D: 131.7 G/M2 (ref 49–115)
ECHO LV POSTERIOR WALL DIASTOLIC: 1.4 CM (ref 0.6–1)
ECHO LV RELATIVE WALL THICKNESS RATIO: 0.58
ECHO LVOT AREA: 2.5 CM2
ECHO LVOT AV VTI INDEX: 0.52
ECHO LVOT DIAM: 1.8 CM
ECHO LVOT MEAN GRADIENT: 3 MMHG
ECHO LVOT PEAK GRADIENT: 5 MMHG
ECHO LVOT PEAK VELOCITY: 1.2 M/S
ECHO LVOT STROKE VOLUME INDEX: 23.8 ML/M2
ECHO LVOT SV: 52.1 ML
ECHO LVOT VTI: 20.5 CM
ECHO MV A VELOCITY: 0.75 M/S
ECHO MV AREA VTI: 2.4 CM2
ECHO MV E DECELERATION TIME (DT): 237 MS
ECHO MV E VELOCITY: 0.75 M/S
ECHO MV E/A RATIO: 1
ECHO MV E/E' LATERAL: 5.4
ECHO MV E/E' RATIO (AVERAGED): 7.23
ECHO MV E/E' SEPTAL: 9.07
ECHO MV LVOT VTI INDEX: 1.06
ECHO MV MAX VELOCITY: 1 M/S
ECHO MV MEAN GRADIENT: 2 MMHG
ECHO MV MEAN VELOCITY: 0.7 M/S
ECHO MV PEAK GRADIENT: 4 MMHG
ECHO MV REGURGITANT PEAK GRADIENT: 18 MMHG
ECHO MV REGURGITANT PEAK VELOCITY: 2.1 M/S
ECHO MV VTI: 21.8 CM
ECHO PV MAX VELOCITY: 0.9 M/S
ECHO PV MEAN GRADIENT: 2 MMHG
ECHO PV MEAN VELOCITY: 0.7 M/S
ECHO PV PEAK GRADIENT: 3 MMHG
ECHO PV VTI: 22.2 CM
ECHO RA AREA 4C: 19.8 CM2
ECHO RA END SYSTOLIC VOLUME APICAL 4 CHAMBER INDEX BSA: 24 ML/M2
ECHO RA VOLUME: 53 ML
ECHO RIGHT VENTRICULAR SYSTOLIC PRESSURE (RVSP): 19 MMHG
ECHO RV FREE WALL PEAK S': 13.2 CM/S
ECHO RV TAPSE: 2.2 CM (ref 1.7–?)
ECHO TV REGURGITANT MAX VELOCITY: 1.99 M/S
ECHO TV REGURGITANT PEAK GRADIENT: 16 MMHG
EKG ATRIAL RATE: 84 BPM
EKG DIAGNOSIS: NORMAL
EKG P AXIS: 47 DEGREES
EKG P-R INTERVAL: 170 MS
EKG Q-T INTERVAL: 300 MS
EKG QRS DURATION: 96 MS
EKG QTC CALCULATION (BAZETT): 354 MS
EKG R AXIS: 18 DEGREES
EKG T AXIS: 47 DEGREES
EKG VENTRICULAR RATE: 84 BPM
EPITH CASTS URNS QL MICRO: ABNORMAL /LPF
ERYTHROCYTE [DISTWIDTH] IN BLOOD BY AUTOMATED COUNT: 14.9 % (ref 11.5–14.5)
FERRITIN SERPL-MCNC: 623 NG/ML (ref 26–388)
GLUCOSE SERPL-MCNC: 104 MG/DL (ref 65–100)
GLUCOSE UR STRIP.AUTO-MCNC: 50 MG/DL
GRAN CASTS URNS QL MICRO: ABNORMAL /LPF
HCT VFR BLD AUTO: 37.7 % (ref 36.6–50.3)
HGB BLD-MCNC: 12.6 G/DL (ref 12.1–17)
HGB UR QL STRIP: NEGATIVE
IRON SATN MFR SERPL: 8 % (ref 20–50)
IRON SERPL-MCNC: 14 UG/DL (ref 35–150)
KETONES UR QL STRIP.AUTO: 5 MG/DL
LEUKOCYTE ESTERASE UR QL STRIP.AUTO: NEGATIVE
MAGNESIUM SERPL-MCNC: 2.4 MG/DL (ref 1.6–2.4)
MCH RBC QN AUTO: 25.9 PG (ref 26–34)
MCHC RBC AUTO-ENTMCNC: 33.4 G/DL (ref 30–36.5)
MCV RBC AUTO: 77.4 FL (ref 80–99)
MUCOUS THREADS URNS QL MICRO: ABNORMAL /LPF
NITRITE UR QL STRIP.AUTO: NEGATIVE
NRBC # BLD: 0 K/UL (ref 0–0.01)
NRBC BLD-RTO: 0 PER 100 WBC
PH UR STRIP: 7 (ref 5–8)
PHOSPHATE SERPL-MCNC: 1.7 MG/DL (ref 2.6–4.7)
PLATELET # BLD AUTO: 209 K/UL (ref 150–400)
PMV BLD AUTO: 10.3 FL (ref 8.9–12.9)
POTASSIUM SERPL-SCNC: 3.6 MMOL/L (ref 3.5–5.1)
PROT UR STRIP-MCNC: 30 MG/DL
RBC # BLD AUTO: 4.87 M/UL (ref 4.1–5.7)
RBC #/AREA URNS HPF: ABNORMAL /HPF (ref 0–5)
SODIUM SERPL-SCNC: 141 MMOL/L (ref 136–145)
SP GR UR REFRACTOMETRY: 1.02 (ref 1–1.03)
TIBC SERPL-MCNC: 184 UG/DL (ref 250–450)
URINE CULTURE IF INDICATED: ABNORMAL
UROBILINOGEN UR QL STRIP.AUTO: 4 EU/DL (ref 0.1–1)
WBC # BLD AUTO: 5.7 K/UL (ref 4.1–11.1)
WBC URNS QL MICRO: ABNORMAL /HPF (ref 0–4)

## 2024-11-15 PROCEDURE — 80069 RENAL FUNCTION PANEL: CPT

## 2024-11-15 PROCEDURE — 3430000000 HC RX DIAGNOSTIC RADIOPHARMACEUTICAL: Performed by: SURGERY

## 2024-11-15 PROCEDURE — 2500000003 HC RX 250 WO HCPCS: Performed by: INTERNAL MEDICINE

## 2024-11-15 PROCEDURE — 1100000000 HC RM PRIVATE

## 2024-11-15 PROCEDURE — 6360000002 HC RX W HCPCS: Performed by: SURGERY

## 2024-11-15 PROCEDURE — 94761 N-INVAS EAR/PLS OXIMETRY MLT: CPT

## 2024-11-15 PROCEDURE — 36415 COLL VENOUS BLD VENIPUNCTURE: CPT

## 2024-11-15 PROCEDURE — A9537 TC99M MEBROFENIN: HCPCS | Performed by: SURGERY

## 2024-11-15 PROCEDURE — 81001 URINALYSIS AUTO W/SCOPE: CPT

## 2024-11-15 PROCEDURE — 83735 ASSAY OF MAGNESIUM: CPT

## 2024-11-15 PROCEDURE — 85027 COMPLETE CBC AUTOMATED: CPT

## 2024-11-15 PROCEDURE — 2700000000 HC OXYGEN THERAPY PER DAY

## 2024-11-15 PROCEDURE — 93306 TTE W/DOPPLER COMPLETE: CPT

## 2024-11-15 PROCEDURE — 2580000003 HC RX 258: Performed by: INTERNAL MEDICINE

## 2024-11-15 PROCEDURE — 78226 HEPATOBILIARY SYSTEM IMAGING: CPT

## 2024-11-15 PROCEDURE — 2580000003 HC RX 258: Performed by: SURGERY

## 2024-11-15 RX ORDER — KIT FOR THE PREPARATION OF TECHNETIUM TC 99M MEBROFENIN 45 MG/10ML
7.06 INJECTION, POWDER, LYOPHILIZED, FOR SOLUTION INTRAVENOUS
Status: COMPLETED | OUTPATIENT
Start: 2024-11-15 | End: 2024-11-15

## 2024-11-15 RX ORDER — FUROSEMIDE 10 MG/ML
40 INJECTION INTRAMUSCULAR; INTRAVENOUS DAILY
Status: DISCONTINUED | OUTPATIENT
Start: 2024-11-15 | End: 2024-11-16 | Stop reason: HOSPADM

## 2024-11-15 RX ORDER — FUROSEMIDE 10 MG/ML
40 INJECTION INTRAMUSCULAR; INTRAVENOUS DAILY
Status: DISCONTINUED | OUTPATIENT
Start: 2024-11-16 | End: 2024-11-15

## 2024-11-15 RX ORDER — FUROSEMIDE 10 MG/ML
20 INJECTION INTRAMUSCULAR; INTRAVENOUS DAILY
Status: DISCONTINUED | OUTPATIENT
Start: 2024-11-15 | End: 2024-11-15

## 2024-11-15 RX ADMIN — PIPERACILLIN AND TAZOBACTAM 3375 MG: 3; .375 INJECTION, POWDER, LYOPHILIZED, FOR SOLUTION INTRAVENOUS at 18:39

## 2024-11-15 RX ADMIN — PIPERACILLIN AND TAZOBACTAM 3375 MG: 3; .375 INJECTION, POWDER, LYOPHILIZED, FOR SOLUTION INTRAVENOUS at 23:55

## 2024-11-15 RX ADMIN — POTASSIUM PHOSPHATE, MONOBASIC POTASSIUM PHOSPHATE, DIBASIC 30 MMOL: 224; 236 INJECTION, SOLUTION, CONCENTRATE INTRAVENOUS at 13:43

## 2024-11-15 RX ADMIN — SODIUM CHLORIDE, POTASSIUM CHLORIDE, SODIUM LACTATE AND CALCIUM CHLORIDE: 600; 310; 30; 20 INJECTION, SOLUTION INTRAVENOUS at 06:10

## 2024-11-15 RX ADMIN — PIPERACILLIN AND TAZOBACTAM 3375 MG: 3; .375 INJECTION, POWDER, LYOPHILIZED, FOR SOLUTION INTRAVENOUS at 05:05

## 2024-11-15 RX ADMIN — MEBROFENIN 7.06 MILLICURIE: 45 INJECTION, POWDER, LYOPHILIZED, FOR SOLUTION INTRAVENOUS at 10:20

## 2024-11-15 ASSESSMENT — PAIN SCALES - GENERAL
PAINLEVEL_OUTOF10: 3
PAINLEVEL_OUTOF10: 0

## 2024-11-15 NOTE — PROGRESS NOTES
Hospitalist consult note        Daily Progress Note: 11/15/2024      Hospital Day: 3     Chief complaint: No chief complaint on file.     Subjectively  Patient feels well, some residual abdominal pain.  Still on 2 L nasal cannula  Afebrile overnight      Assessment and plan    Fever with hypoxia  Max fever 102  Currently on nasal cannula 2 L saturating 92%  Lactic acidosis resolved, no leukocytosis  COVID and flu negative  Chest x-ray with evidence of mild congestion and small left pleural effusion  NT proBNP elevated to 95  Continue oxygen for saturation more than 92%  Start Lasix 20 mg IV daily  Continue Zosyn and azithromycin  Order echo  Follow-up HIDA scan  Will also get baseline EKG, will talk to the nurse as it was not done  Follow-up mycoplasma pneumonia  Check urinalysis  Discontinue IV fluids, please do not restart them as patient might have heart failure     Abdominal pain  Suspect for cholecystitis  Ultrasound with cholelithiasis and acute versus of acute cholecystitis  CT abdomen pelvis with no acute process  Manage per surgery team  Follow-up HIDA scan    Microcytosis and increased RDW  Follow-up iron studies    Hematuria  On CT evidence of thickened bladder  Will repeat urinalysis patient states that [facility]  Needs follow-up on this outpatient, with unexplained hematuria there is always concern for cancer    ELNA  Get CPAP    CODE STATUS full  DVT prophylaxis on hold anticipating procedure    Medications reviewed  Current Facility-Administered Medications   Medication Dose Route Frequency    azithromycin (ZITHROMAX) 500 mg in sodium chloride 0.9 % 250 mL IVPB ([id number])  500 mg IntraVENous Q24H    ketorolac (TORADOL) injection 15 mg  15 mg IntraVENous Q8H PRN    piperacillin-tazobactam (ZOSYN) 3,375 mg in sodium chloride 0.9 % 50 mL IVPB (mini-bag)  3,375 mg IntraVENous Q6H    ondansetron (ZOFRAN) injection 4 mg  4 mg IntraVENous Q6H PRN     5.57 4.10 - 5.70 M/uL    Hemoglobin 14.2 12.1 - 17.0 g/dL    Hematocrit 43.2 36.6 - 50.3 %    MCV 77.6 (L) 80.0 - 99.0 FL    MCH 25.5 (L) 26.0 - 34.0 PG    MCHC 32.9 30.0 - 36.5 g/dL    RDW 14.8 (H) 11.5 - 14.5 %    Platelets 193 150 - 400 K/uL    MPV 9.8 8.9 - 12.9 FL    Nucleated RBCs 0.0 0.0  WBC    nRBC 0.00 0.00 - 0.01 K/uL    Neutrophils % 78 (H) 32 - 75 %    Lymphocytes % 11 (L) 12 - 49 %    Monocytes % 11 5 - 13 %    Eosinophils % 0 0 - 7 %    Basophils % 0 0 - 1 %    Immature Granulocytes % 0 0 - 0.5 %    Neutrophils Absolute 5.4 1.8 - 8.0 K/UL    Lymphocytes Absolute 0.8 0.8 - 3.5 K/UL    Monocytes Absolute 0.8 0.0 - 1.0 K/UL    Eosinophils Absolute 0.0 0.0 - 0.4 K/UL    Basophils Absolute 0.0 0.0 - 0.1 K/UL    Immature Granulocytes Absolute 0.0 0.00 - 0.04 K/UL    Differential Type AUTOMATED     Mycoplasma pneumoniae antibody, IgM    Collection Time: 11/14/24 10:44 AM   Result Value Ref Range    Mycoplasma pneumo IgM NONREACTIVE NONREACTIVE     TSH + Free T4 Panel    Collection Time: 11/14/24 10:44 AM   Result Value Ref Range    TSH, 3rd Generation 0.76 0.36 - 3.74 uIU/mL    T4 Free 0.9 0.8 - 1.5 ng/dL   COVID-19 & Influenza Combo    Collection Time: 11/14/24 11:04 AM    Specimen: Nasopharyngeal   Result Value Ref Range    SARS-CoV-2, PCR Not Detected Not Detected      Rapid Influenza A By PCR Not Detected Not Detected      Rapid Influenza B By PCR Not Detected Not Detected     Brain Natriuretic Peptide    Collection Time: 11/14/24  3:16 PM   Result Value Ref Range    NT Pro- (H) <125 pg/mL   Urinalysis with Reflex to Culture    Collection Time: 11/14/24  4:27 PM    Specimen: Urine   Result Value Ref Range    Color, UA Yellow/Straw      Appearance Clear Clear      Specific Gravity, UA 1.019 1.003 - 1.030      pH, Urine 6.0 5.0 - 8.0      Protein,  (A) Negative mg/dL    Glucose, Ur 50 (A) Negative mg/dL    Ketones, Urine Negative Negative mg/dL    Bilirubin, Urine Negative Negative

## 2024-11-15 NOTE — H&P
[facility] SURGERY H&P           Chief Complaint: Side abdominal pain    History of Present Illness:    Mr. Ranjit Medrano is a 58 y.o. year old * male transferred from [facility] emergency room.  The emergency room physician talked to me and said the patient has been complaining of epigastric and right upper quadrant pain.  Transferred here for suspected cholecystitis.  On questioning the patient patient states that he has been experiencing mid abdominal pain since Monday and then the pain localized to right side.  He also had 1 episode of what nausea and vomiting.  1 diarrhea.  Low-grade fever.    CT scan negative.  HIDA pending.  Still has right sided pain.    Past Medical History:   Past Medical History:   Diagnosis Date    Arthritis     Hypertension        Past Surgical History: No past surgical history on file.     Allergy:No Known Allergies    Social History:  reports that he has never smoked. He has never used smokeless tobacco.     Family History:No family history on file.     Current Medications:  Current Facility-Administered Medications:     azithromycin (ZITHROMAX) 500 mg in sodium chloride 0.9 % 250 mL IVPB ([id number]), 500 mg, IntraVENous, Q24H, Adolfo Orr DO, Stopped at 11/14/24 1218    ketorolac (TORADOL) injection 15 mg, 15 mg, IntraVENous, Q8H PRN, Bradley Hebert MD, 15 mg at 11/14/24 2030    lactated ringers infusion, , IntraVENous, Continuous, Bradley Hebert MD, Last Rate: 100 mL/hr at 11/14/24 2034, New Bag at 11/14/24 2034    diatrizoate meglumine-sodium (GASTROGRAFIN) 66-10 % solution 30 mL, 30 mL, Oral, ONCE PRN, Bradley Hebert MD, 30 mL at 11/13/24 2152    piperacillin-tazobactam (ZOSYN) 3,375 mg in sodium chloride 0.9 % 50 mL IVPB (mini-bag), 3,375 mg, IntraVENous, Q6H, Bradley Hebert MD, Stopped at 11/14/24 1840    ondansetron (ZOFRAN) injection 4 mg, 4 mg, IntraVENous, Q6H PRN, Bradley Hebert MD    acetaminophen

## 2024-11-15 NOTE — RT PROTOCOL NOTE
Pt going to bring in his own machine tomorrow. Our machine will be on standby incase pt SpO2 drops during the night. We do not have machines that have ranges, only one number can be input for Ipap or epap. Pt understands he will have to go on if O2 drops during the night.

## 2024-11-15 NOTE — PROGRESS NOTES
Pulmonology Progress Note    Subjective:     Chief Complaint: Abdominal pain     This patient has been seen and evaluated at the request of Dr. Hebert.     Patient seen and examined in his room on the floor this afternoon, no acute events overnight.  Still on 2 L nasal cannula, Tmax 100.0 °F, overall fever curve much improved.  Continue CPAP nightly, chest x-ray with mild central vascular congestion.  Agree with Lasix 40 mg IV daily with strict I's/O's.  CBC/BMP stable, phosphorus low at 1.7.  Getting IV K-Phos today.  TTE pending.  HIDA scan pending.  Fluids discontinued, with which I agree.  TSH and free T4 both normal, negative influenza/COVID-19 testing, negative mycoplasma testing.  Continue on current antibiotics.          Current Facility-Administered Medications   Medication Dose Route Frequency Provider Last Rate Last Admin    potassium phosphate 30 mmol in sodium chloride 0.9 % 500 mL IVPB  30 mmol IntraVENous Once Adolfo Orr DO        furosemide (LASIX) injection 40 mg  40 mg IntraVENous Daily Adolfo Orr DO        azithromycin (ZITHROMAX) 500 mg in sodium chloride 0.9 % 250 mL IVPB ([id number])  500 mg IntraVENous Q24H Adolfo Orr DO   Stopped at 11/14/24 1209    ketorolac (TORADOL) injection 15 mg  15 mg IntraVENous Q8H PRN Bradley Hebert MD   15 mg at 11/14/24 2030    piperacillin-tazobactam (ZOSYN) 3,375 mg in sodium chloride 0.9 % 50 mL IVPB (mini-bag)  3,375 mg IntraVENous Q6H Bradley Hebert MD   Stopped at 11/15/24 0531    ondansetron (ZOFRAN) injection 4 mg  4 mg IntraVENous Q6H PRN Bradley Hebert MD        acetaminophen (TYLENOL) tablet 650 mg  650 mg Oral Q6H PRN Bradley Hebert MD   650 mg at 11/14/24 1722        Home Meds:  No current facility-administered medications on file prior to encounter.     Current Outpatient Medications on File Prior to Encounter   Medication Sig Dispense Refill    traZODone (DESYREL) 50 MG tablet Take 5 mg by mouth nightly

## 2024-11-16 ENCOUNTER — APPOINTMENT (OUTPATIENT)
Facility: HOSPITAL | Age: 59
End: 2024-11-16
Attending: SURGERY
Payer: OTHER GOVERNMENT

## 2024-11-16 VITALS
WEIGHT: 236.11 LBS | OXYGEN SATURATION: 97 % | HEIGHT: 68 IN | BODY MASS INDEX: 35.78 KG/M2 | RESPIRATION RATE: 18 BRPM | DIASTOLIC BLOOD PRESSURE: 92 MMHG | TEMPERATURE: 98.2 F | HEART RATE: 72 BPM | SYSTOLIC BLOOD PRESSURE: 157 MMHG

## 2024-11-16 LAB
ALBUMIN SERPL-MCNC: 2.7 G/DL (ref 3.5–5)
ANION GAP SERPL CALC-SCNC: 5 MMOL/L (ref 2–12)
BUN SERPL-MCNC: 16 MG/DL (ref 6–20)
BUN/CREAT SERPL: 19 (ref 12–20)
CA-I BLD-MCNC: 8.8 MG/DL (ref 8.5–10.1)
CHLORIDE SERPL-SCNC: 108 MMOL/L (ref 97–108)
CO2 SERPL-SCNC: 26 MMOL/L (ref 21–32)
CREAT SERPL-MCNC: 0.85 MG/DL (ref 0.7–1.3)
ERYTHROCYTE [DISTWIDTH] IN BLOOD BY AUTOMATED COUNT: 14.7 % (ref 11.5–14.5)
GLUCOSE SERPL-MCNC: 94 MG/DL (ref 65–100)
HCT VFR BLD AUTO: 37.5 % (ref 36.6–50.3)
HGB BLD-MCNC: 12.4 G/DL (ref 12.1–17)
MAGNESIUM SERPL-MCNC: 2.2 MG/DL (ref 1.6–2.4)
MCH RBC QN AUTO: 25.4 PG (ref 26–34)
MCHC RBC AUTO-ENTMCNC: 33.1 G/DL (ref 30–36.5)
MCV RBC AUTO: 76.8 FL (ref 80–99)
NRBC # BLD: 0 K/UL (ref 0–0.01)
NRBC BLD-RTO: 0 PER 100 WBC
PHOSPHATE SERPL-MCNC: 2.4 MG/DL (ref 2.6–4.7)
PLATELET # BLD AUTO: 259 K/UL (ref 150–400)
PMV BLD AUTO: 10.2 FL (ref 8.9–12.9)
POTASSIUM SERPL-SCNC: 3.6 MMOL/L (ref 3.5–5.1)
RBC # BLD AUTO: 4.88 M/UL (ref 4.1–5.7)
SODIUM SERPL-SCNC: 139 MMOL/L (ref 136–145)
WBC # BLD AUTO: 5.8 K/UL (ref 4.1–11.1)

## 2024-11-16 PROCEDURE — 2580000003 HC RX 258: Performed by: INTERNAL MEDICINE

## 2024-11-16 PROCEDURE — 6360000002 HC RX W HCPCS: Performed by: SURGERY

## 2024-11-16 PROCEDURE — 71045 X-RAY EXAM CHEST 1 VIEW: CPT

## 2024-11-16 PROCEDURE — 6370000000 HC RX 637 (ALT 250 FOR IP)

## 2024-11-16 PROCEDURE — 36415 COLL VENOUS BLD VENIPUNCTURE: CPT

## 2024-11-16 PROCEDURE — 85027 COMPLETE CBC AUTOMATED: CPT

## 2024-11-16 PROCEDURE — 83735 ASSAY OF MAGNESIUM: CPT

## 2024-11-16 PROCEDURE — 94761 N-INVAS EAR/PLS OXIMETRY MLT: CPT

## 2024-11-16 PROCEDURE — 2580000003 HC RX 258: Performed by: SURGERY

## 2024-11-16 PROCEDURE — 6360000002 HC RX W HCPCS: Performed by: INTERNAL MEDICINE

## 2024-11-16 PROCEDURE — 80069 RENAL FUNCTION PANEL: CPT

## 2024-11-16 RX ORDER — FERROUS SULFATE 325(65) MG
325 TABLET ORAL EVERY OTHER DAY
Status: DISCONTINUED | OUTPATIENT
Start: 2024-11-16 | End: 2024-11-16 | Stop reason: HOSPADM

## 2024-11-16 RX ORDER — AZITHROMYCIN 500 MG/1
500 TABLET, FILM COATED ORAL DAILY
Qty: 3 TABLET | Refills: 0 | Status: SHIPPED | OUTPATIENT
Start: 2024-11-16 | End: 2024-11-19

## 2024-11-16 RX ORDER — FERROUS SULFATE 325(65) MG
325 TABLET ORAL EVERY OTHER DAY
Qty: 30 TABLET | Refills: 3 | Status: SHIPPED | OUTPATIENT
Start: 2024-11-16

## 2024-11-16 RX ADMIN — FUROSEMIDE 40 MG: 10 INJECTION, SOLUTION INTRAMUSCULAR; INTRAVENOUS at 09:56

## 2024-11-16 RX ADMIN — AZITHROMYCIN MONOHYDRATE 500 MG: 500 INJECTION, POWDER, LYOPHILIZED, FOR SOLUTION INTRAVENOUS at 10:03

## 2024-11-16 RX ADMIN — PIPERACILLIN AND TAZOBACTAM 3375 MG: 3; .375 INJECTION, POWDER, LYOPHILIZED, FOR SOLUTION INTRAVENOUS at 06:02

## 2024-11-16 RX ADMIN — FERROUS SULFATE TAB 325 MG (65 MG ELEMENTAL FE) 325 MG: 325 (65 FE) TAB at 09:56

## 2024-11-16 ASSESSMENT — PAIN SCALES - GENERAL: PAINLEVEL_OUTOF10: 0

## 2024-11-16 NOTE — PROGRESS NOTES
[facility] SURGERY PROGRESS NOTES          Chief Complaint: Side abdominal pain    History of Present Illness:    Mr. Ranjit Medrano is a 58 y.o. year old * male transferred from [facility] emergency room.  The emergency room physician talked to me and said the patient has been complaining of epigastric and right upper quadrant pain.  Transferred here for suspected cholecystitis.  On questioning the patient patient states that he has been experiencing mid abdominal pain since Monday and then the pain localized to right side.  He also had 1 episode of what nausea and vomiting.  1 diarrhea.  Low-grade fever.    Denies any abdominal pain today.  He had a bowel movement and since then his abdominal pain is completely resolved.  He is feeling a lot better.  He is hungry and wants to eat.  His HIDA scan from today showed no activity in the gallbladder area consistent with cystic duct occlusion.  CT scan negative.    He is still on 3 L of nasal oxygen.  Seen by pulmonologist and hospitalist and appreciate their consult.      Past Medical History:   Past Medical History:   Diagnosis Date    Arthritis     Hypertension        Past Surgical History: No past surgical history on file.     Allergy:No Known Allergies    Social History:  reports that he has never smoked. He has never used smokeless tobacco.     Family History:No family history on file.     Current Medications:  Current Facility-Administered Medications:     furosemide (LASIX) injection 40 mg, 40 mg, IntraVENous, Daily, Adolfo Orr DO    azithromycin (ZITHROMAX) 500 mg in sodium chloride 0.9 % 250 mL IVPB ([id number]), 500 mg, IntraVENous, Q24H, Adolfo Orr DO, Stopped at 11/14/24 1209    ketorolac (TORADOL) injection 15 mg, 15 mg, IntraVENous, Q8H PRN, Bradley Hebetr MD, 15 mg at 11/14/24 2030    piperacillin-tazobactam (ZOSYN) 3,375 mg in sodium chloride 0.9 % 50 mL IVPB (mini-bag), 3,375 mg, IntraVENous, Q6H, Bradley Hebert  left pleural effusion.         Electronically signed by Carlos Ramirez      CT ABDOMEN PELVIS W IV CONTRAST Additional Contrast? Oral   Final Result   No acute process. No evidence of appendicitis as questioned clinically.      Electronically signed by Mitchell Banuelos      XR CHEST PORTABLE    (Results Pending)       Assessment:  Abdominal pain, resolved.    Plan:    Admission  Diet: Soft diet  IV fluids  SCD  IS  Pain medications  Antibiotics  Nausea medication  Labs in the a.m.  Consult: Hospitalist & Pulmonary  Stat CT scan of the abdomen pelvis with p.o. and IV contrast: Negative  HIDA Scan: Negative  I had a long conversation with the patient and his wife and his daughter by the bedside.  Clinically he has no abdominal pain. Nor does he have any tenderness in the right upper quadrant or positive Mari sign.  His WBC count is also normal.  However he is still on 3 L of nasal oxygen.  Even though his HIDA scan failed to visualize the gallbladder given his history of being n.p.o. for the past 3 days might be a reason why his HIDA scan might be negative.  I recommended medical management and pulmonary management to see how he does and try to feed him and see how he feels.  Will continue antibiotic therapy at this time point.  Await for pulmonary and hospitalist clearance.  Patient is eager to go home and follow-up at the [facility] with his PCP.  They are not keen on having gallbladder surgery at this time point.  Will reevaluate in the a.m.     Thank you for the consultation & allowing me to participate in the care of this patient.

## 2024-11-16 NOTE — PROGRESS NOTES
[facility] SURGERY PROGRESS NOTES          Chief Complaint: Side abdominal pain    History of Present Illness:    Mr. Ranjit Medrano is a 58 y.o. year old * male transferred from [facility] emergency room.  The emergency room physician talked to me and said the patient has been complaining of epigastric and right upper quadrant pain.  Transferred here for suspected cholecystitis.  On questioning the patient patient states that he has been experiencing mid abdominal pain since Monday and then the pain localized to right side.  He also had 1 episode of what nausea and vomiting.  1 diarrhea.  Low-grade fever.    CT scan negative.  HIDA pending.  Still has right sided pain.    Past Medical History:   Past Medical History:   Diagnosis Date    Arthritis     Hypertension        Past Surgical History: No past surgical history on file.     Allergy:No Known Allergies    Social History:  reports that he has never smoked. He has never used smokeless tobacco.     Family History:No family history on file.     Current Medications:  Current Facility-Administered Medications:     furosemide (LASIX) injection 40 mg, 40 mg, IntraVENous, Daily, Adolfo Orr DO    azithromycin (ZITHROMAX) 500 mg in sodium chloride 0.9 % 250 mL IVPB ([id number]), 500 mg, IntraVENous, Q24H, Adolfo Orr DO, Stopped at 11/14/24 1209    ketorolac (TORADOL) injection 15 mg, 15 mg, IntraVENous, Q8H PRN, Bradley Hebert MD, 15 mg at 11/14/24 2030    piperacillin-tazobactam (ZOSYN) 3,375 mg in sodium chloride 0.9 % 50 mL IVPB (mini-bag), 3,375 mg, IntraVENous, Q6H, Bradley Hebert MD, Stopped at 11/15/24 2006    ondansetron (ZOFRAN) injection 4 mg, 4 mg, IntraVENous, Q6H PRN, Bradley Hebert MD    acetaminophen (TYLENOL) tablet 650 mg, 650 mg, Oral, Q6H PRN, Bradley Hebert MD, 650 mg at 11/14/24 1722     Immunization History:   There is no immunization history on file for this patient.   Complete    Review

## 2024-11-16 NOTE — PLAN OF CARE
Pt denies pain, ambulated in hallway without oxygen, maintained o2 sat, pulm into see pt and cleared , updated Dr. Hebert. Spouse at bedside. Pt wants to be discharged today.

## 2024-11-16 NOTE — PROGRESS NOTES
Hospitalist progress note        Daily Progress Note: 11/16/2024      Hospital Day: 4     Chief complaint: No chief complaint on file.     Subjectively  Patient is on room air today  Surgery evaluated, his HIDA scan was negative and patient is reluctant about any surgical procedures and [facility]  Patient denies any abdominal pain today, no shortness of breath, no chest pain, no nausea or vomiting    Assessment and plan    Fever with hypoxia  Max fever 102  On room air this morning  Lactic acidosis resolved, no leukocytosis  COVID and flu negative  Chest x-ray with evidence of mild congestion and small left pleural effusion  Echo with normal EF 55 to 60% and normal diastolic function  NT proBNP elevated 253  Mycoplasma antigen negative  No diuretics on discharge, follow-up with primary care  If patient is to be discharged I recommend amoxicillin/clavulanate and azithromycin p.o. to complete 7 days of antibiotics.  If no surgical interventions are planned patient is cleared to be discharged from hospitalist perspective  We will sign off and will be available for any questions if needed    Abdominal pain  Suspect for cholecystitis  Ultrasound with cholelithiasis and acute versus of acute cholecystitis  CT abdomen pelvis with no acute process  HIDA scan negative  Surgery to evaluate this morning    Microcytosis and increased RDW  Iron studies with evidence of iron deficiency iron 14, saturation 8  Recommend starting ferrous sulfate    Hematuria-resolved  On CT evidence of thickened bladder  Repeat urinalysis without evidence of blood  Would still follow-up outpatient with urology regarding thickened bladder evidence on CT and intermittent hematuria    LENA  Get CPAP    CODE STATUS full  DVT prophylaxis none    Medications reviewed  Current Facility-Administered Medications   Medication Dose Route Frequency    furosemide (LASIX) injection 40 mg  40 mg IntraVENous Daily     \"[id number]\", \"FIO2A\", \"[id number]\", \"OXYHEM\", \"CARBOXHGBART\", \"METHGBART\", \"[id number]\", \"PHCORART\", \"TEMP\" in the last 72 hours.    Invalid input(s): \"[id number]\"    24 Hour Results:  Recent Results (from the past 24 hour(s))   Echo (TTE) complete (PRN contrast/bubble/strain/3D)    Collection Time: 11/15/24  2:26 PM   Result Value Ref Range    Body Surface Area 2.27 m2    LV EDV A4C 149 mL    LV ESV A4C 66 mL    IVSd 1.5 (A) 0.6 - 1.0 cm    LVIDd 4.8 4.2 - 5.9 cm    LVIDs 2.8 cm    LVOT Diameter 1.8 cm    LVOT Mean Gradient 3 mmHg    LVOT VTI 20.5 cm    LVOT Peak Velocity 1.2 m/s    LVOT Peak Gradient 5 mmHg    LVPWd 1.4 (A) 0.6 - 1.0 cm    LV E' Lateral Velocity 13.90 cm/s    LV E' Septal Velocity 8.27 cm/s    LV Ejection Fraction A4C 56 %    LVOT Area 2.5 cm2    LVOT SV 52.1 ml    LA Major [place] 5.4 cm    LA Area 4C 16.8 cm2    LA Volume MOD A4C 44 18 - 58 mL    LA Diameter 4.1 cm    RA Area 4C 19.8 cm2    RA Volume 53 ml    AV Mean Gradient 9 mmHg    AV VTI 39.5 cm    AV Mean Velocity 1.4 m/s    AV Peak Velocity 2.1 m/s    AV Peak Gradient 17 mmHg    AV Area by VTI 1.3 cm2    AV Area by Peak Velocity 1.4 cm2    Aortic Root 2.8 cm    Ascending Aorta 3.4 cm    MR Peak Velocity 2.1 m/s    MR Peak Gradient 18 mmHg    MV Max Velocity 1.0 m/s    MV Peak Gradient 4 mmHg    MV E Wave Deceleration Time 237.0 ms    MV A Velocity 0.75 m/s    MV E Velocity 0.75 m/s    MV Mean Gradient 2 mmHg    MV VTI 21.8 cm    MV Mean Velocity 0.7 m/s    MV Area by VTI 2.4 cm2    PV Mean Gradient 2 mmHg    PV VTI 22.2 cm    PV Mean Velocity 0.7 m/s    PV Max Velocity 0.9 m/s    PV Peak Gradient 3 mmHg    RV Free Wall Peak S' 13.2 cm/s    TAPSE 2.2 1.7 cm    TR Max Velocity 1.99 m/s    TR Peak Gradient 16 mmHg    Fractional Shortening 2D 42 28 - 44 %    LV ESV Index A4C 30 mL/m2    LV EDV Index A4C 68 mL/m2    LVIDd Index 2.19 cm/m2    LVIDs Index 1.28 cm/m2    LV RWT Ratio 0.58     LV Mass 2D 288.4 (A) 88 - 224 g    LV Mass 2D Index 131.7 (A)

## 2024-11-16 NOTE — PROGRESS NOTES
Pulmonology Progress Note    Subjective:     Chief Complaint: Abdominal pain     This patient has been seen and evaluated at the request of Dr. Hebert.     Patient seen and examined today in his room.  RN at the bedside.  Wife is also at the bedside.  He is on room air.  He ambulated in the hallway without desaturations.  He feels just fine.  No shortness of breath cough or chest pain.  No abdominal pain.  He is eating food.  He wants to go home.  He will follow-up with his physicians at the [facility].  He had a chest x-ray done today which shows resolution of pulmonary edema and left effusion.  He uses a CPAP machine at home.  Insignificant tobacco abuse.    On 11/15/2024:  Patient seen and examined in his room on the floor this afternoon, no acute events overnight.  Still on 2 L nasal cannula, Tmax 100.0 °F, overall fever curve much improved.  Continue CPAP nightly, chest x-ray with mild central vascular congestion.  Agree with Lasix 40 mg IV daily with strict I's/O's.  CBC/BMP stable, phosphorus low at 1.7.  Getting IV K-Phos today.  TTE pending.  HIDA scan pending.  Fluids discontinued, with which I agree.  TSH and free T4 both normal, negative influenza/COVID-19 testing, negative mycoplasma testing.  Continue on current antibiotics.          Current Facility-Administered Medications   Medication Dose Route Frequency Provider Last Rate Last Admin    ferrous sulfate (IRON 325) tablet 325 mg  325 mg Oral Every Other Day Surekha Olivares MD   325 mg at 11/16/24 0956    furosemide (LASIX) injection 40 mg  40 mg IntraVENous Daily Adolfo Orr DO   40 mg at 11/16/24 0956    azithromycin (ZITHROMAX) 500 mg in sodium chloride 0.9 % 250 mL IVPB ([id number])  500 mg IntraVENous Q24H Adolfo Orr  mL/hr at 11/16/24 1003 500 mg at 11/16/24 1003    ketorolac (TORADOL) injection 15 mg  15 mg IntraVENous Q8H PRN Bradley Hebert MD   15 mg at 11/14/24 2030    piperacillin-tazobactam (ZOSYN) 3,375 mg in sodium  Nucleated RBCs 0.0 0.0  WBC    nRBC 0.00 0.00 - 0.01 K/uL   Renal Function Panel    Collection Time: 11/16/24  6:13 AM   Result Value Ref Range    Sodium 139 136 - 145 mmol/L    Potassium 3.6 3.5 - 5.1 mmol/L    Chloride 108 97 - 108 mmol/L    CO2 26 21 - 32 mmol/L    Anion Gap 5 2 - 12 mmol/L    Glucose 94 65 - 100 mg/dL    BUN 16 6 - 20 mg/dL    Creatinine 0.85 0.70 - 1.30 mg/dL    BUN/Creatinine Ratio 19 12 - 20      Est, Glom Filt Rate >90 >60 ml/min/1.73m2    Calcium 8.8 8.5 - 10.1 mg/dL    Phosphorus 2.4 (L) 2.6 - 4.7 mg/dL    Albumin 2.7 (L) 3.5 - 5.0 g/dL   Magnesium    Collection Time: 11/16/24  6:13 AM   Result Value Ref Range    Magnesium 2.2 1.6 - 2.4 mg/dL       Right upper quadrant ultrasound (11/13/2024):  FINDINGS:     Liver: echogenicity is moderately increased. Surface is smooth. Multiple small  anechoic cysts. Largest cyst measures up to 1.7 cm, increased since 2016.     Main portal vein flow: Toward the liver.     Fluid: No ascites.     Gallbladder: Shadowing subcentimeter multiple gallstones. Mild gallbladder wall  thickening. Positive pericholecystic fluid is subtle. Positive sonographic  Mari sign.     Bile ducts: There is no intra or extrahepatic biliary ductal dilatation.  The  visible extrahepatic bile duct measures 5 mm.     Pancreas: Obscured by bowel gas.     Kidneys: Right length: 10.3 cm. No hydronephrosis. 3 cm anechoic cyst has  increased in size since 2016.     IMPRESSION:     1. Cholelithiasis and acute versus subacute cholecystitis.  2. Moderate hepatic steatosis. Consider liver biopsy if cholecystectomy is  performed. Increased hepatic benign cysts.  3. Increased right renal benign cyst.         CT abdomen/pelvis w/ IVC (11/14/2024):  FINDINGS:  LUNG BASES: No abnormality.  LIVER: Numerous small hypodensities throughout the liver similar to prior study  most of which are too small to characterize but likely represent cysts.  GALLBLADDER: Small gallstones in the dependent

## 2024-11-16 NOTE — PLAN OF CARE
Problem: Discharge Planning  Goal: Discharge to home or other facility with appropriate resources  11/15/2024 2331 by Jessi Whitehead, RN  Outcome: Progressing  11/15/2024 1148 by Shaila Briseno, RN  Outcome: Progressing     Problem: Skin/Tissue Integrity  Goal: Absence of new skin breakdown  Description: 1.  Monitor for areas of redness and/or skin breakdown  2.  Assess vascular access sites hourly  3.  Every 4-6 hours minimum:  Change oxygen saturation probe site  4.  Every 4-6 hours:  If on nasal continuous positive airway pressure, respiratory therapy assess nares and determine need for appliance change or resting period.  11/15/2024 2331 by Jessi Whitehead, RN  Outcome: Progressing  11/15/2024 1148 by Shaila Briseno, RN  Outcome: Progressing     Problem: Pain  Goal: Verbalizes/displays adequate comfort level or baseline comfort level  11/15/2024 1148 by Shaila Briseno, RN  Outcome: Progressing

## 2024-11-16 NOTE — PROGRESS NOTES
Pt advised that he did not want to use Hospital BIPAP. Machine was left in room, per order, but not set-up. Pt advised that he will wait until he goes home to use his own machine. Pt was left on 2 LPM nasal cannula for the night.

## 2024-11-16 NOTE — PROGRESS NOTES
Pt has been on room air since 0715am with no episodes of desaturation or sob. Ambulated in hallway on room air with continues pulse ox, ambulated over 250ft without sob, sat remained % on room air.

## 2024-11-18 NOTE — PROGRESS NOTES
Physician Progress Note      PATIENT:               KELLIE WALKER  CSN #:                  [id number]  :                       1965  ADMIT DATE:       2024 4:50 PM  DISCH DATE:        2024 2:48 PM  RESPONDING  PROVIDER #:        Adolfo Orr MD          QUERY TEXT:    Patient admitted with RLQ abdominal pain. Noted documentation of \"Acute   hypoxemic respiratory failure\" per Pulmonary consult note dated . In   order to support the diagnosis of acute respiratory failure, please include   additional clinical indicators in your documentation.  Or please document if   the diagnosis of acute respiratory failure has been ruled out after further   study.    The medical record reflects the following:  Risk Factors: Hx: HTN; C/o RLQ Abdominal Pain  Clinical Indicators: RR: 20-22; O2 Sats: (): 96% on RA; 93% on RA; 95% on   RA; 100% on RA; 93% on RA; 90%on RA; 88% on RA; 92% on 2L NC; 94% on 3L NC;   94% on 3L NC-intermittent; 93% on 2L NC; (11/15): 97% on 2L NC; 94% on 1L NC;   (): CXR: Mild central vascular congestion and small left pleural   effusion; Influenza A/B: Neg; COVID-19: Neg; Mycoplasma pneumo IgM:   Non-reactive     ED PN: Noted: \"Effort: Pulmonary effort is normal; Breath sounds: Normal   breath sounds\".     AP: Noted: \"Respiratory: Normal chest wall expansion, CTA B, no r/r/w,   no rubs\".     Pulm PN: \"Acute hypoxemic respiratory failure-Unclear etiology, but   suspect underlying atelectasis from not taking deep breaths related to   abdominal pain from acute cholecystitis\".  .......Noted per Pulm: \"Lungs: clear to auscultation bilaterally and currently   on 3 L nasal cannula\".    Treatment: Supplemental O2 w/ 1-3l NC; CXR; Pulmonary consult; Wean off for   goal sats greater than 90% on room air; Recommend walking O2 test prior to   discharge; Continue aggressive incentive spirometer use; Pulmonary infectious   workup;    Thank you,    Lisa

## 2025-04-24 NOTE — PROGRESS NOTES
Anesthesia Post-op Note    Patient: Jolie Peter  Procedure(s) Performed: ESOPHAGOGASTRODUODENOSCOPY (EGD)  Anesthesia type: MAC    Vitals Value Taken Time   Temp 36.5 °C (97.7 °F) 04/24/25 1506   Pulse 92 04/24/25 1506   Resp 18 04/24/25 1506   SpO2 98 % 04/24/25 1506   /86 04/24/25 1506         Patient Location: bedside  Post-op Vital Signs:stable  Level of Consciousness: awake, responds to stimulation, follows commands, participates in exam, oriented and alert  Respiratory Status: spontaneous ventilation  Cardiovascular blood pressure returned to baseline  Hydration: euvolemic  Pain Management: well controlled  Handoff: Handoff to receiving clinician was performed and questions were answered  Vomiting: none  Nausea: None  Airway Patency:patent  Post-op Assessment: awake, alert, appropriately conversant, or baseline, no complications, patient tolerated procedure well, no evidence of recall, dentition, mouth, tongue, and oropharynx unchanged , moving all extremities and No Corneal Abrasion    No notable events documented.                       Patient oxygen saturation assessed at 86-88 percent on room air, pt placed on o2 at 2L increased 92 percent oxygen increase to 3L now 94. Provider Emilee Notified. New order got STAT pulmonology consult r/t hypoxia, hospitalist consult for medical management and stat HIDA Scan.    0911- called received from [place] in nuclear medicine stat HIDA scan scheduled for 1pm